# Patient Record
Sex: MALE | Race: BLACK OR AFRICAN AMERICAN | NOT HISPANIC OR LATINO
[De-identification: names, ages, dates, MRNs, and addresses within clinical notes are randomized per-mention and may not be internally consistent; named-entity substitution may affect disease eponyms.]

---

## 2018-03-19 ENCOUNTER — APPOINTMENT (OUTPATIENT)
Dept: CT IMAGING | Facility: HOSPITAL | Age: 58
End: 2018-03-19

## 2018-03-19 ENCOUNTER — OUTPATIENT (OUTPATIENT)
Dept: OUTPATIENT SERVICES | Facility: HOSPITAL | Age: 58
LOS: 1 days | End: 2018-03-19
Payer: COMMERCIAL

## 2018-03-19 PROCEDURE — 71250 CT THORAX DX C-: CPT

## 2018-03-19 PROCEDURE — 71250 CT THORAX DX C-: CPT | Mod: 26

## 2019-03-17 ENCOUNTER — TRANSCRIPTION ENCOUNTER (OUTPATIENT)
Age: 59
End: 2019-03-17

## 2019-03-17 ENCOUNTER — INPATIENT (INPATIENT)
Facility: HOSPITAL | Age: 59
LOS: 0 days | Discharge: HOME | DRG: 313 | End: 2019-03-17
Attending: INTERNAL MEDICINE | Admitting: INTERNAL MEDICINE
Payer: COMMERCIAL

## 2019-03-17 VITALS
RESPIRATION RATE: 20 BRPM | TEMPERATURE: 98 F | DIASTOLIC BLOOD PRESSURE: 92 MMHG | HEART RATE: 73 BPM | HEIGHT: 69 IN | OXYGEN SATURATION: 100 % | WEIGHT: 205.03 LBS | SYSTOLIC BLOOD PRESSURE: 150 MMHG

## 2019-03-17 VITALS — TEMPERATURE: 98 F

## 2019-03-17 DIAGNOSIS — I10 ESSENTIAL (PRIMARY) HYPERTENSION: ICD-10-CM

## 2019-03-17 DIAGNOSIS — I20.0 UNSTABLE ANGINA: ICD-10-CM

## 2019-03-17 DIAGNOSIS — Z98.890 OTHER SPECIFIED POSTPROCEDURAL STATES: Chronic | ICD-10-CM

## 2019-03-17 DIAGNOSIS — Z96.641 PRESENCE OF RIGHT ARTIFICIAL HIP JOINT: Chronic | ICD-10-CM

## 2019-03-17 DIAGNOSIS — R07.9 CHEST PAIN, UNSPECIFIED: ICD-10-CM

## 2019-03-17 LAB
CK MB CFR SERPL CALC: 8.3 NG/ML — HIGH (ref 0–6.7)
CK MB CFR SERPL CALC: 9.2 NG/ML — HIGH (ref 0–6.7)
CK SERPL-CCNC: 5354 U/L — HIGH (ref 30–200)
CK SERPL-CCNC: 5684 U/L — HIGH (ref 30–200)
CRP SERPL-MCNC: 0.44 MG/DL — HIGH (ref 0–0.4)
ERYTHROCYTE [SEDIMENTATION RATE] IN BLOOD: 5 MM/HR — SIGNIFICANT CHANGE UP
HCV AB S/CO SERPL IA: 0.07 S/CO — SIGNIFICANT CHANGE UP
HCV AB SERPL-IMP: SIGNIFICANT CHANGE UP
RAPID RVP RESULT: SIGNIFICANT CHANGE UP
TROPONIN T SERPL-MCNC: <0.01 NG/ML — SIGNIFICANT CHANGE UP (ref 0–0.01)
TROPONIN T SERPL-MCNC: <0.01 NG/ML — SIGNIFICANT CHANGE UP (ref 0–0.01)

## 2019-03-17 PROCEDURE — 87486 CHLMYD PNEUM DNA AMP PROBE: CPT

## 2019-03-17 PROCEDURE — 82550 ASSAY OF CK (CPK): CPT

## 2019-03-17 PROCEDURE — 99285 EMERGENCY DEPT VISIT HI MDM: CPT | Mod: 25

## 2019-03-17 PROCEDURE — 85730 THROMBOPLASTIN TIME PARTIAL: CPT

## 2019-03-17 PROCEDURE — 84484 ASSAY OF TROPONIN QUANT: CPT

## 2019-03-17 PROCEDURE — 86803 HEPATITIS C AB TEST: CPT

## 2019-03-17 PROCEDURE — 85025 COMPLETE CBC W/AUTO DIFF WBC: CPT

## 2019-03-17 PROCEDURE — 86140 C-REACTIVE PROTEIN: CPT

## 2019-03-17 PROCEDURE — 87633 RESP VIRUS 12-25 TARGETS: CPT

## 2019-03-17 PROCEDURE — 71046 X-RAY EXAM CHEST 2 VIEWS: CPT | Mod: 26

## 2019-03-17 PROCEDURE — 87798 DETECT AGENT NOS DNA AMP: CPT

## 2019-03-17 PROCEDURE — 99220: CPT

## 2019-03-17 PROCEDURE — 80053 COMPREHEN METABOLIC PANEL: CPT

## 2019-03-17 PROCEDURE — 93005 ELECTROCARDIOGRAM TRACING: CPT

## 2019-03-17 PROCEDURE — 36415 COLL VENOUS BLD VENIPUNCTURE: CPT

## 2019-03-17 PROCEDURE — 85610 PROTHROMBIN TIME: CPT

## 2019-03-17 PROCEDURE — 93010 ELECTROCARDIOGRAM REPORT: CPT

## 2019-03-17 PROCEDURE — 85652 RBC SED RATE AUTOMATED: CPT

## 2019-03-17 PROCEDURE — 82553 CREATINE MB FRACTION: CPT

## 2019-03-17 PROCEDURE — 71046 X-RAY EXAM CHEST 2 VIEWS: CPT

## 2019-03-17 PROCEDURE — 83735 ASSAY OF MAGNESIUM: CPT

## 2019-03-17 PROCEDURE — 87581 M.PNEUMON DNA AMP PROBE: CPT

## 2019-03-17 RX ORDER — LISINOPRIL 2.5 MG/1
20 TABLET ORAL DAILY
Qty: 0 | Refills: 0 | Status: DISCONTINUED | OUTPATIENT
Start: 2019-03-17 | End: 2019-03-17

## 2019-03-17 RX ORDER — COLCHICINE 0.6 MG
0.6 TABLET ORAL ONCE
Qty: 0 | Refills: 0 | Status: COMPLETED | OUTPATIENT
Start: 2019-03-17 | End: 2019-03-17

## 2019-03-17 RX ORDER — AMLODIPINE BESYLATE 2.5 MG/1
0 TABLET ORAL
Qty: 0 | Refills: 0 | COMMUNITY

## 2019-03-17 RX ORDER — SODIUM CHLORIDE 9 MG/ML
1000 INJECTION INTRAMUSCULAR; INTRAVENOUS; SUBCUTANEOUS ONCE
Qty: 0 | Refills: 0 | Status: COMPLETED | OUTPATIENT
Start: 2019-03-17 | End: 2019-03-17

## 2019-03-17 RX ORDER — AMLODIPINE BESYLATE 2.5 MG/1
10 TABLET ORAL DAILY
Qty: 0 | Refills: 0 | Status: DISCONTINUED | OUTPATIENT
Start: 2019-03-17 | End: 2019-03-17

## 2019-03-17 RX ADMIN — Medication 0.6 MILLIGRAM(S): at 10:15

## 2019-03-17 RX ADMIN — SODIUM CHLORIDE 1000 MILLILITER(S): 9 INJECTION INTRAMUSCULAR; INTRAVENOUS; SUBCUTANEOUS at 10:15

## 2019-03-17 NOTE — H&P ADULT - NSICDXPASTSURGICALHX_GEN_ALL_CORE_FT
PAST SURGICAL HISTORY:  S/P hip replacement, right     S/P left knee surgery PAST SURGICAL HISTORY:  S/P hip replacement, right     S/P left knee surgery     S/P shoulder surgery
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
## 2019-03-17 NOTE — H&P ADULT - NSICDXPROBLEM_GEN_ALL_CORE_FT
PROBLEM DIAGNOSES  Problem: Unstable angina  Assessment and Plan: CP free, Trop neg x 1, CK 5519, CKMB 11.1, 1L IVF bolus given in ED  -trend trops and CK/CKMB @ 1pm and 5pm  -f/u ESR/CRP  -continue to monitor telemetry  -continue ASA 81    Problem: HTN (hypertension)  Assessment and Plan: -continue home Amlodipine/Benazepril 10/20mg PROBLEM DIAGNOSES  Problem: Unstable angina  Assessment and Plan: CP free, Trop neg x 1, CK 5519, CKMB 11.1, 1L IVF bolus given in ED  -trend trops and CK/CKMB @ 1pm and 5pm  -f/u ESR/CRP  -continue to monitor telemetry    Problem: HTN (hypertension)  Assessment and Plan: -continue home Amlodipine/Benazepril 10/20mg PROBLEM DIAGNOSES  Problem: Chest pain  Assessment and Plan: CP free, Trop neg x 1, CK 5519, CKMB 11.1, 1L IVF bolus given in ED  -trend trops and CK/CKMB @ 1pm and 5pm  -continue to monitor telemetry    Problem: HTN (hypertension)  Assessment and Plan: -continue home Amlodipine/Benazepril 10/20mg

## 2019-03-17 NOTE — H&P ADULT - NSHPLABSRESULTS_GEN_ALL_CORE
13.4   4.61  )-----------( 253      ( 17 Mar 2019 08:17 )             40.2       03-17    140  |  106  |  11  ----------------------------<  114<H>  3.9   |  22  |  0.94    Ca    9.2      17 Mar 2019 08:17  Mg     2.1     03-17    TPro  7.4  /  Alb  4.2  /  TBili  0.5  /  DBili  x   /  AST  56<H>  /  ALT  32  /  AlkPhos  59  03-17      PT/INR - ( 17 Mar 2019 08:17 )   PT: 10.9 sec;   INR: 0.97          PTT - ( 17 Mar 2019 08:17 )  PTT:32.7 sec    CARDIAC MARKERS ( 17 Mar 2019 08:17 )  x     / <0.01 ng/mL / 5519 U/L / x     / 11.1 ng/mL            EKG: NSR, 65bpm, no ischemic changes

## 2019-03-17 NOTE — ED ADULT TRIAGE NOTE - OTHER COMPLAINTS
CC of Chest Pain L sided below the breast non-radiating, sharp in nature, constant and not aggravated by the movement, Hx of HTN and on Amlodipine. Coughing on-and-off, smokes 6 sticks in a day

## 2019-03-17 NOTE — H&P ADULT - ASSESSMENT
57yo M, current smoker, with PMHx of HTN who is admitted for further workup of unstable angina and r/o ACS.

## 2019-03-17 NOTE — DISCHARGE NOTE PROVIDER - NSDCCPCAREPLAN_GEN_ALL_CORE_FT
PRINCIPAL DISCHARGE DIAGNOSIS  Diagnosis: Chest pain  Assessment and Plan of Treatment: You presented to the hospital with chest pain, which has now resolved. You had Cardiac enzymes lab drawn which revealed no evidence of a heart attack and damage to the heart. Your symptoms are most likely not cardiac related and most likely musculoskeletal. If you experience any of the following symptoms, but not limited to chest pain, shortness of breath or dizziness, please go to the nearest emergency room. Please follow up with your primary care doctor in 1-2 weeks.      SECONDARY DISCHARGE DIAGNOSES  Diagnosis: HTN (hypertension)  Assessment and Plan of Treatment: Please continue medications as listed to keep your blood pressure controlled. For blood pressure that is too high or too low please see your doctor or go to the emergency room as necessary.

## 2019-03-17 NOTE — ED PROVIDER NOTE - OBJECTIVE STATEMENT
58M with chest pain intermittent x 2 days l sided assoc with cold sx, no nausea, no vomiting, no diaphoresis, no shortness of breath no leg swelling.

## 2019-03-17 NOTE — ED PROVIDER NOTE - CLINICAL SUMMARY MEDICAL DECISION MAKING FREE TEXT BOX
58M with concern for chest pain intermittent L sided no sob no nausea no vomiting pt with + URI sx, plan for rvp, admission to tele for concern for chest pain. Doubt ACS troponin neg no ekg changes, can consider pericarditis/pericardial effusion will give colchicine admit for echo trending of cardiac enzymes, will give iv fluids. Doubt ptx neg cxr. doubt pna no elevated wbc count. plan for admission to cards
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
## 2019-03-17 NOTE — DISCHARGE NOTE NURSING/CASE MANAGEMENT/SOCIAL WORK - NSDCDPATPORTLINK_GEN_ALL_CORE
You can access the [facility] Patient Portal, offered by [facility], by registering with the following website: [email or web address]

## 2019-03-22 DIAGNOSIS — Z96.641 PRESENCE OF RIGHT ARTIFICIAL HIP JOINT: ICD-10-CM

## 2019-03-22 DIAGNOSIS — R07.9 CHEST PAIN, UNSPECIFIED: ICD-10-CM

## 2019-03-22 DIAGNOSIS — I10 ESSENTIAL (PRIMARY) HYPERTENSION: ICD-10-CM

## 2019-03-22 DIAGNOSIS — F17.210 NICOTINE DEPENDENCE, CIGARETTES, UNCOMPLICATED: ICD-10-CM

## 2019-05-18 ENCOUNTER — TRANSCRIPTION ENCOUNTER (OUTPATIENT)
Age: 59
End: 2019-05-18

## 2019-10-24 ENCOUNTER — EMERGENCY (EMERGENCY)
Facility: HOSPITAL | Age: 59
LOS: 1 days | Discharge: HOME | End: 2019-10-24
Attending: EMERGENCY MEDICINE | Admitting: EMERGENCY MEDICINE
Payer: COMMERCIAL

## 2019-10-24 VITALS
RESPIRATION RATE: 17 BRPM | OXYGEN SATURATION: 99 % | WEIGHT: 210.1 LBS | DIASTOLIC BLOOD PRESSURE: 90 MMHG | TEMPERATURE: 98 F | SYSTOLIC BLOOD PRESSURE: 146 MMHG | HEART RATE: 90 BPM

## 2019-10-24 DIAGNOSIS — R23.4 CHANGES IN SKIN TEXTURE: ICD-10-CM

## 2019-10-24 DIAGNOSIS — Z96.641 PRESENCE OF RIGHT ARTIFICIAL HIP JOINT: Chronic | ICD-10-CM

## 2019-10-24 DIAGNOSIS — Z79.899 OTHER LONG TERM (CURRENT) DRUG THERAPY: ICD-10-CM

## 2019-10-24 DIAGNOSIS — Z98.890 OTHER SPECIFIED POSTPROCEDURAL STATES: Chronic | ICD-10-CM
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
## 2019-10-24 DIAGNOSIS — S50.811D ABRASION OF RIGHT FOREARM, SUBSEQUENT ENCOUNTER: ICD-10-CM

## 2019-10-24 DIAGNOSIS — S52.201D UNSPECIFIED FRACTURE OF SHAFT OF RIGHT ULNA, SUBSEQUENT ENCOUNTER FOR CLOSED FRACTURE WITH ROUTINE HEALING: ICD-10-CM

## 2019-10-24 DIAGNOSIS — S52.91XD UNSPECIFIED FRACTURE OF RIGHT FOREARM, SUBSEQUENT ENCOUNTER FOR CLOSED FRACTURE WITH ROUTINE HEALING: ICD-10-CM

## 2019-10-24 PROCEDURE — 99282 EMERGENCY DEPT VISIT SF MDM: CPT
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
## 2019-10-24 NOTE — ED PROVIDER NOTE - NSFOLLOWUPINSTRUCTIONS_ED_ALL_ED_FT
Can take tylenol 650mg or motrin 600mg (May cause stomach irritation - take with food and avoid prolonged use) every 6hrs as needed for pain or fever.  Stay well hydrated.  Return for fevers, persistent vomit, uncontrolled pain, worsening breathing, worsening lightheaded, focal weakness/numbness.  Follow up with primary doctor within 1-2 days.   Follow up with orthopedist. Can call [phone] to schedule appointment.     Fracture    A fracture is a break in one of your bones. This can occur from a variety of injuries, especially traumatic ones. Symptoms include pain, bruising, or swelling. Do not use the injured limb. If a fracture is in one of the bones below your waist, do not put weight on that limb unless instructed to do so by your healthcare provider. Crutches or a cane may have been provided. A splint or cast may have been applied by your health care provider. Make sure to keep it dry and follow up with an orthopedist as instructed.    SEEK IMMEDIATE MEDICAL CARE IF YOU HAVE ANY OF THE FOLLOWING SYMPTOMS: numbness, tingling, increasing pain, or weakness in any part of the injured limb.

## 2019-10-24 NOTE — ED ADULT NURSE NOTE - NSIMPLEMENTINTERV_GEN_ALL_ED
Implemented All Fall with Harm Risk Interventions:  [place] to call system. Call bell, personal items and telephone within reach. Instruct patient to call for assistance. Room bathroom lighting operational. Non-slip footwear when patient is off stretcher. Physically safe environment: no spills, clutter or unnecessary equipment. Stretcher in lowest position, wheels locked, appropriate side rails in place. Provide visual cue, wrist band, yellow gown, etc. Monitor gait and stability. Monitor for mental status changes and reorient to person, place, and time. Review medications for side effects contributing to fall risk. Reinforce activity limits and safety measures with patient and family. Provide visual clues: red socks.

## 2019-10-24 NOTE — ED PROVIDER NOTE - PHYSICAL EXAMINATION
no LE edema, normal equal distal pulses.  R forearm in ulnar gutter. normal cap refill.  took splint off. Pt has minimal superficial abrasions to forearm that are scabbed over. no swelling/erythema/warmth. placed new webril, used old splint and new ace wrap. neurovascularly intact post application.

## 2019-10-24 NOTE — ED PROVIDER NOTE - PATIENT PORTAL LINK FT
You can access the FollowMyHealth Patient Portal offered by [facility] by registering at the following website: [email or web address]. By joining [employer]’s FollowMyHealth portal, you will also be able to view your health information using other applications (apps) compatible with our system.
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
## 2022-07-19 NOTE — H&P ADULT - AS O2 DELIVERY
[facility] Emergency [facility] Laboratory Locations - No appointment necessary. @ indicates the location is open Saturdays in addition to Monday through Friday. Call your preferred location for test preparation, business hours and other information you need. [employer] accepts [employer]. [facility]    @ [facility] [address] [facility]. [place], [address]   Ph: [phone] [facility] Svcs. [address]   Ph: [phone]  @ [facility] Svcs. [address] [facility]   Ph: [phone]    [facility] Lab Svcs. [address] [facility] [place], [address]   Ph: [phone] @ [facility] Sv[facility]  Ph: [phone] @ [facility] Svcs. [address] [facility]. [address]   Ph: [phone]     [facility] Svcs. [place] [address]  Ph: [phone]    [facility]   @ [place] [facility] Svcs. [address] [facility] [address] [facility] [address]   Ph: [phone] [facility]. Office Bldg. [address] Nick Tellez, [address] [facility]  Ph: [address] [facility] [id number] [place] [address] [address] Lab Sv[address] [facility]   Ph: [phone] [facility]. Lab Svcs. [address] [facility], [address] [facility] [address]   Ph: [phone]      [email or web address]     [email or web address]
room air

## 2023-01-30 NOTE — ED ADULT TRIAGE NOTE - NS ED TRIAGE AVPU SCALE
Called pt, voiced understanding and thanks.    Alert-The patient is alert, awake and responds to voice. The patient is oriented to time, place, and person. The triage nurse is able to obtain subjective information.

## 2023-10-13 NOTE — PATIENT PROFILE ADULT - NSPROMUTANXFEARFT_GEN_A_NUR
NURSING DISCHARGE NOTE    Discharged Home via Ambulatory. Accompanied by Family member  Belongings Taken by patient/family. VSS. Afebrile. Remains stable on RA. Keena PO ad jenise. Mom and Dad both verbalized understanding of and received a copy of discharge instructions.  Pt D/C'd home with Mom and Dad without difficulty- Mari Garcia RN
Patient admitted via EMS  Oriented to room. Safety precautions initiated. Bed in low position. Call light in reach. Patient admitted into room 186 in stable condition from [facility] ER via EMS with mother and father at bedside at [phone]. VSS, afebrile, on room air. Free s/s pain. MD and this RN at bedside. Oriented to room and unit, questions answered, and updated on plan of care/orders reviewed. Safety precautions in place. Hugs tag applied. Will monitor patient closely and intervene as needed.
Pt Tmax today 100.2. VSS. Remains on room air. Pt. Has no interest in eating or drinking today. Pt has only taken a few sips of water. Pt remains on ordered IV fluids. PIV soft and patent. Lungs are clear throughout. Strong and congested cough. No suctioning needed. No increase work of breathing noted. Mom at bedside and has been updated on plan of care by MD and RN.
denies